# Patient Record
Sex: FEMALE | Race: WHITE | NOT HISPANIC OR LATINO | Employment: FULL TIME | URBAN - METROPOLITAN AREA
[De-identification: names, ages, dates, MRNs, and addresses within clinical notes are randomized per-mention and may not be internally consistent; named-entity substitution may affect disease eponyms.]

---

## 2018-07-17 ENCOUNTER — HOSPITAL ENCOUNTER (EMERGENCY)
Facility: CLINIC | Age: 63
Discharge: HOME | End: 2018-07-18
Attending: EMERGENCY MEDICINE | Admitting: EMERGENCY MEDICINE
Payer: COMMERCIAL

## 2018-07-17 DIAGNOSIS — R07.0 THROAT PAIN: ICD-10-CM

## 2018-07-17 DIAGNOSIS — R13.10 DYSPHAGIA, UNSPECIFIED TYPE: ICD-10-CM

## 2018-07-17 PROCEDURE — 25000125 ZZHC RX 250: Performed by: EMERGENCY MEDICINE

## 2018-07-17 PROCEDURE — 42999 UNLISTED PX PHRNX ADND/TNSL: CPT

## 2018-07-17 PROCEDURE — 94640 AIRWAY INHALATION TREATMENT: CPT

## 2018-07-17 PROCEDURE — 99284 EMERGENCY DEPT VISIT MOD MDM: CPT | Mod: 25

## 2018-07-17 RX ORDER — HYDROCODONE BITARTRATE AND ACETAMINOPHEN 5; 325 MG/1; MG/1
1 TABLET ORAL EVERY 6 HOURS PRN
COMMUNITY

## 2018-07-17 RX ORDER — OXYMETAZOLINE HYDROCHLORIDE 0.05 G/100ML
2 SPRAY NASAL ONCE
Status: COMPLETED | OUTPATIENT
Start: 2018-07-17 | End: 2018-07-18

## 2018-07-17 RX ORDER — IPRATROPIUM BROMIDE AND ALBUTEROL SULFATE 2.5; .5 MG/3ML; MG/3ML
3 SOLUTION RESPIRATORY (INHALATION) ONCE
Status: COMPLETED | OUTPATIENT
Start: 2018-07-17 | End: 2018-07-17

## 2018-07-17 RX ADMIN — IPRATROPIUM BROMIDE AND ALBUTEROL SULFATE 3 ML: .5; 3 SOLUTION RESPIRATORY (INHALATION) at 23:20

## 2018-07-17 RX ADMIN — LIDOCAINE HYDROCHLORIDE 1.5 ML: 40 INJECTION, SOLUTION RETROBULBAR; TOPICAL at 23:59

## 2018-07-17 ASSESSMENT — ENCOUNTER SYMPTOMS
FEVER: 0
COUGH: 1
TROUBLE SWALLOWING: 1
SHORTNESS OF BREATH: 0
CHILLS: 0
SORE THROAT: 1

## 2018-07-17 NOTE — ED AVS SNAPSHOT
Emergency Department    [address] [place] [id number]    Phone:  [phone]    Fax:  [phone]                                       Mela Urbano   MRN: [id number]    Department:   Emergency Department   Date of Visit:  7/17/2018           After Visit Summary Signature Page     I have received my discharge instructions, and my questions have been answered. I have discussed any challenges I see with this plan with the nurse or doctor.    ..........................................................................................................................................  Patient/Patient Representative Signature      ..........................................................................................................................................  Patient Representative Print Name and Relationship to Patient    ..................................................               ................................................  Date                                            Time    ..........................................................................................................................................  Reviewed by Signature/Title    ...................................................              ..............................................  Date                                                            Time

## 2018-07-18 ENCOUNTER — APPOINTMENT (OUTPATIENT)
Dept: GENERAL RADIOLOGY | Facility: CLINIC | Age: 63
End: 2018-07-18
Attending: EMERGENCY MEDICINE
Payer: COMMERCIAL

## 2018-07-18 VITALS
SYSTOLIC BLOOD PRESSURE: 113 MMHG | RESPIRATION RATE: 14 BRPM | DIASTOLIC BLOOD PRESSURE: 75 MMHG | TEMPERATURE: 97.8 F | BODY MASS INDEX: 24.16 KG/M2 | OXYGEN SATURATION: 96 % | HEART RATE: 80 BPM | WEIGHT: 145 LBS | HEIGHT: 65 IN

## 2018-07-18 PROCEDURE — 70360 X-RAY EXAM OF NECK: CPT

## 2018-07-18 PROCEDURE — 71046 X-RAY EXAM CHEST 2 VIEWS: CPT

## 2018-07-18 PROCEDURE — 25000125 ZZHC RX 250: Performed by: EMERGENCY MEDICINE

## 2018-07-18 RX ORDER — PANTOPRAZOLE SODIUM 40 MG/1
40 TABLET, DELAYED RELEASE ORAL ONCE
Status: DISCONTINUED | OUTPATIENT
Start: 2018-07-18 | End: 2018-07-18 | Stop reason: HOSPADM

## 2018-07-18 RX ADMIN — OXYMETAZOLINE HYDROCHLORIDE 2 SPRAY: 5 SPRAY NASAL at 00:07

## 2018-07-18 RX ADMIN — LIDOCAINE HYDROCHLORIDE 10 ML: 20 JELLY TOPICAL at 00:08

## 2018-07-18 NOTE — DISCHARGE INSTRUCTIONS
Soft Diet  Your healthcare provider has prescribed a soft diet (also called gastrointestinal soft diet). This means eating foods that are soft, low in fiber, and easy to digest. This diet is for people with digestive problems. A soft diet provides foods that are easy to chew and swallow. Foods should be bite-size and very soft or moist. Follow your healthcare provider s specific instructions about what foods and drinks you may have. The general guidelines below can help you get started on this diet.       Beverages  OK: Milk, tea, coffee, fruit juices, carbonated beverages, nutrition shakes, and drinks (Note: Thin liquids may be hard to swallow. They may need to be thickened.)  Avoid: None, unless they need to be thickened  Breads and crackers  OK: Refined white, wheat, or seedless rye bread; any or soda crackers that have been moistened; plain rolls or bagels; very soft tortillas  Avoid: Whole-grain breads, rolls, or bagels with nuts, raisins, or seeds; crackers, croutons, taco shells  Cereals and grains  OK: Cooked cereals, plain dry cereals that have been moistened, plain macaroni, spaghetti, noodles, rice  Avoid: Whole-grain cereals and granola, or cereals containing bran, raisins, seeds or nuts; coconut; brown or wild rice  Desserts and sweets  OK: Moist cake; soft fruit pie with bottom crust only; soft cookies moistened in milk or other liquid; gelatin, custard, pudding, plain ice cream, plain sherbet, sugar, honey, clear jelly  Avoid: Pastries, desserts, and ice cream that have nuts, coconut, seeds, or dried fruit; popcorn; chips of any kind including potato chips and tortilla chips; jam, marmalade  Eggs and cheese  OK: Poached, soft boiled, or scrambled eggs; cottage cheese, ricotta cheese, cream cheese, cheese sauces, or cheese melted in other dishes  Avoid: Crisp fried eggs, cheese slices and cubes  Fruits  OK: Avocado, banana, baked peeled apple, applesauce, peeled ripe peaches or pears, canned fruit  (apricots, cherries, peaches, pears), melons  Avoid: Raw apple, dried fruits, coconut, pineapple, grapes, fruit moisés, fruit snacks  Meat and fish  OK: All fresh meat, poultry, or fish that is cooked until tender  Avoid: Meat, fish, or poultry that is fried; tough or stringy meat including oates, sausage, bratwurst, jerky, corned beef  Other protein foods  OK: Tofu, baked beans, smooth peanut butter or other nut or seed butters  Avoid: Deep-fried tofu; crunchy peanut or other nut or seed butters; nuts or seeds that are whole or chopped  Soups  OK: All soups, but they may need to be thickened. Thin liquid may be too hard to swallow.  Avoid: Soups made with stringy meat pieces or chunky vegetables  Vegetables  OK: Peeled and well-cooked potatoes or sweet potatoes; fresh, cooked, canned, or frozen vegetables without seeds, skin, or coarse fiber  Avoid: Raw vegetables, deep-fried vegetables (such as tempura), and corn  Date Last Reviewed: 8/1/2016 2000-2017 [employer]. [address] [place] [address]. All rights reserved. This information is not intended as a substitute for professional medical care. Always follow your healthcare professional's instructions.          Dysphagia (Adult)    Dysphagia is trouble swallowing. If you have dysphagia, you may have symptoms that include:    Choking or coughing when you eat or drink    Food getting stuck    Drooling    Vomiting after you eat or drink    Aspirating (inhaling into the lungs) foods or liquids when you swallow  The main causes of dysphagia are problems that affect the mouth, throat or tongue. Dysphagia may be caused by a problem with the esophagus (tube that carries food from the mouth to the stomach). These include blockage, swelling, or irritation from acid reflux or injury. Problems in the brain, such as a stroke or Parkinson's disease, can affect the muscles that coordinate swallowing.  Dysphagia is treated by treating the cause. You  may be given medicine to reduce stomach acid or control muscle spasms. If the problem doesn't go away, a procedure can be done to widen (dilate) the esophagus. If you have muscle or nerve problems, you may be advised to see a speech or occupational therapist. He or she may give you exercises and instructions to help make eating safer.   Home care  To help ease the symptoms of dysphagia:    Take any medicine you ve been given exactly as directed. Ask for liquid medicines if you need them.    To make eating easier:  ? Eat slowly.   ? Eat in a relaxed setting.  ? Don t talk while you eat.  ? Take small bites. Chew slowly and completely before you swallow.  ? Sit upright during and after meals.  ? Puree solid foods if needed. Thicken liquids with milk, juice, broth, gravy, or starch to make them easier to swallow.  ? Ask your healthcare provider if a liquid diet may be better for you.  Follow-up care  Follow up with your healthcare provider or as directed. Your healthcare provider can give you information about tests you may need.   When to seek medical advice  Call your healthcare provider right away for any of the following:    Inability to keep down food or liquid    Symptoms that get worse quickly    Coughing that won't stop    Continuing to lose weight    Fever of 100.4 F (38 C) or higher, or as directed by your healthcare provider    Other symptoms as indicated by your healthcare provider  Call 911  Call 911 for any of the following:    Trouble breathing    Inability to talk    Loss of consciousness  Date Last Reviewed: 6/22/2015 2000-2017 [employer]. [address] [place] [address], [place] [address]. All rights reserved. This information is not intended as a substitute for professional medical care. Always follow your healthcare professional's instructions.

## 2018-07-18 NOTE — ED PROVIDER NOTES
"  History     Chief Complaint:  Throat discomfort    HPI   Mela Urbano is a 62 year old female who presents to the emergency department today for evaluation of sore throat and throat constriction.  Last week the patient got a potato chip stuck in her throat. It took 20 minutes to get fully dislodged and then got a sore throat and dry cough. Then, on 7/13/18, the patient ran into a wall and hit her nose and lip causing swelling in those areas, and has since been having neck pain and worsening sore throat when she leans back and lays down. Then, 3 nights ago, she woke up with a feeling of tightness in her throat and was completely unable to cough or breathe until her throat relaxed. This has happened each night up to her presentation today. She has been eating soft foods okay, but does note some difficulty swallowing. She denies any shortness of breath, fever, chills, or food currently stuck in her throat. She has no posterior neck pain, no radiating pain into arms or paresthesias. She did not lose consciousness when she struck her face. She is not having headache. She denies lung pain or dyspnea. She reports she just feels like she can't get air through her neck/throat when she has symptoms. She describes a feeling of fullness, or like something is stuck in her neck/throat.    Allergies:  No Known Drug Allergies    Medications:    Medications reviewed. No pertinent medications.    Past Medical History:    History reviewed. No pertinent medical history.  \"digestive problems\"    Past Surgical History:    History reviewed. No pertinent surgical history.    Family History:    History reviewed. No pertinent family history.    Social History:  The patient was accompanied to the ED by her  .  Smoking Status: Never Smoker  Smokeless Tobacco: Never Used  Alcohol Use: Negative  Marital status:      Review of Systems   Constitutional: Negative for chills and fever.   HENT: Positive for sore throat and trouble " "swallowing.    Respiratory: Positive for cough. Negative for shortness of breath.    All other systems reviewed and are negative.    Physical Exam     Patient Vitals for the past 24 hrs:   BP Temp Temp src Pulse Heart Rate Resp SpO2 Height Weight   07/18/18 0030 - - - - 84 - 96 % - -   07/18/18 0005 - - - - - - 100 % - -   07/18/18 0004 113/75 - - - - - - - -   07/17/18 2239 97/55 97.8  F (36.6  C) Oral 80 80 14 96 % 1.651 m (5' 5\") 65.8 kg (145 lb)      Physical Exam  Constitutional:  Appears well-developed and well-nourished. Cooperative. Looks uncomfortable.    Occasional dry cough. Non-labored respirations. Normal speech.  HENT:   Head:    Atraumatic.   Mouth/Throat:   Oropharynx is without erythema or exudate and mucous     membranes are moist. Mildly hoarse voice. No stridor. No lymphadenopathy. Oropharynx has no exudate or swelling.     Eyes:    Conjunctivae normal and EOM are normal.      Pupils are equal, round, and reactive to light.   Neck:    Normal range of motion. Neck supple.   Cardiovascular:  Normal rate, regular rhythm, normal heart sounds and radial and    dorsalis pedis pulses are 2+ and symmetric.    Pulmonary/Chest:  Effort normal and breath sounds normal.   Abdominal:   Soft. Bowel sounds are normal.      No splenomegaly or hepatomegaly. No tenderness. No rebound.   Musculoskeletal:  Normal range of motion. No edema and no tenderness. No calf tenderness  Neurological:  Alert. Normal strength. No cranial nerve deficit. GCS 15.  Skin:    Skin is warm and dry.   Psychiatric:   Normal mood and affect.     Emergency Department Course     Imaging:  Radiology findings were communicated with the patient who voiced understanding of the findings.    Neck soft tissue XR  Negative.  Reading per radiology    Chest XR,  PA & LAT  Minimal linear atelectasis or scarring at the left base.  Lungs otherwise clear. Normal-sized cardiac silhouette. Scoliosis  convex to the right in the thoracic spine.  Reading per " radiology    Procedures:  PROCEDURE:  Fiberoptic Pharyngoscopy  PERFORMED BY:  Ilir Wren MD  INDICATION:  concern for foreign body  PREPARATION:  The patient's right nare was prepped using a afrin and lidocaine via nebulizer   MEDICATION:  Afrin 2 sprays, lidocaine 1% 3 ml by nebulizer  PROCEDURE NOTE:  A MAD device was used to spray 3ml in the right nares. Uro-Jet was used to anesthetize the nares. A fiber optic scope was placed.  I do not see any abnormal masses, foreign body, swelling, erythema or traumatic injury of the epiglottis or upper airway.  PATIENT STATUS:  The patient tolerated the procedure well and there were no immediate complications.     Interventions:  2320 Duoneb, 3 mL, nebulization    2359 Lidocaine 4% spray 1.5 mL Nebulization  0007 Afrin spray x2 Nasally  0008 Uro-Jet 10 mL Urethrally    Emergency Department Course:    2240 Nursing notes and vitals reviewed.    2255 I performed an exam of the patient as documented above.     0033 The patient was sent for a XR while in the emergency department, results above.      0101 I personally reviewed the imaging results with the patient and answered all related questions prior to discharge.    Impression & Plan      Medical Decision Making:  Mela Urbano is a 62 year old female who presents to the emergency department today for evaluation of throat discomfort, pain with swallowing, and episodes of shortness of breath since she swallowed a potato chip sideways about 6 days ago. She has had a few episodes where she feels like she cannot catch her breath. On initial exam, she looks well. Her lungs are clear. Posterior oropharynx looks unremarkable. I used a nasopharyngoscope and I do not see any abnormal masses, foreign body, swelling of the epiglottis or upper airway. Soft tissues neck XR does not show any abnormal swelling and her CXR looks clear.     In preparation for the nasopharyngoscope the patient got a lidocaine neb treatment along  with Uro-Jet for numbing and lubrication of her nose and some Afrin. Her nares did not allow passage of the np scope through her nose so the scope was passed orally. She tolerated this well.    I discussed that the patient may be having some degree of reflux which is increasing inflammation where she had the likely scratch from the chip and this may be responsible for the ongoing symptoms. I started her on a PPI. She said she will not take this type of medicine given past troubles with digestion. She does agree to elevate the head of her bed and modify her diet. She will take a soft diet as well. She could take baking soda or other acid reducers at nighttime before bed, and she will try this.    She agress to follow up with the Emergency Department for increased pain, trouble breathing, inability to swallow, or for other concerns.    Of note, there is no chest pain or lung symptoms. Symptoms do note suggest a coronary process nor pulmonary embolism. There is no sign of infiltrate or aspiration seen on CXR. I did not think there was a role for further lab testing or imaging. Patient discharged in good condition.    Diagnosis:    ICD-10-CM    1. Dysphagia, unspecified type R13.10    2. Throat pain R07.0      Disposition:   Discharge    Discharge Medications:  Discharge Medication List as of 7/18/2018  1:08 AM      START taking these medications    Details   omeprazole (PRILOSEC) 20 MG CR capsule Take 1 capsule (20 mg) by mouth 2 times daily, Disp-30 capsule, R-0, Local Print           Scribe Disclosure:  Kurt ROJAS, am serving as a scribe at 10:39 PM on 7/17/2018 to document services personally performed by Ilir Wren MD based on my observations and the provider's statements to me.      EMERGENCY DEPARTMENT       Ilir Wren MD  07/18/18 3003

## 2018-07-18 NOTE — ED NOTES
Patient in with complaints of sore throat and SOB. States incessant coughing. Also complains of trachea collapsing, causing her to have difficulty breathing.